# Patient Record
Sex: MALE | Race: WHITE
[De-identification: names, ages, dates, MRNs, and addresses within clinical notes are randomized per-mention and may not be internally consistent; named-entity substitution may affect disease eponyms.]

---

## 2017-08-11 ENCOUNTER — HOSPITAL ENCOUNTER (OUTPATIENT)
Dept: HOSPITAL 35 - PAIN | Age: 77
Discharge: HOME | End: 2017-08-11
Attending: ANESTHESIOLOGY
Payer: COMMERCIAL

## 2017-08-11 VITALS — BODY MASS INDEX: 26 KG/M2 | WEIGHT: 161.8 LBS | HEIGHT: 65.98 IN

## 2017-08-11 VITALS — DIASTOLIC BLOOD PRESSURE: 78 MMHG | SYSTOLIC BLOOD PRESSURE: 151 MMHG

## 2017-08-11 DIAGNOSIS — M54.16: Primary | ICD-10-CM

## 2018-03-02 ENCOUNTER — HOSPITAL ENCOUNTER (OUTPATIENT)
Dept: HOSPITAL 35 - PAIN | Age: 78
End: 2018-03-02
Attending: FAMILY MEDICINE
Payer: COMMERCIAL

## 2018-03-02 DIAGNOSIS — R05: Primary | ICD-10-CM

## 2018-05-30 ENCOUNTER — HOSPITAL ENCOUNTER (OUTPATIENT)
Dept: HOSPITAL 35 - CAT | Age: 78
End: 2018-05-30
Attending: FAMILY MEDICINE
Payer: COMMERCIAL

## 2018-05-30 DIAGNOSIS — R91.8: ICD-10-CM

## 2018-05-30 DIAGNOSIS — J92.9: Primary | ICD-10-CM

## 2018-08-08 ENCOUNTER — HOSPITAL ENCOUNTER (OUTPATIENT)
Dept: HOSPITAL 35 - RAD | Age: 78
End: 2018-08-08
Attending: FAMILY MEDICINE
Payer: COMMERCIAL

## 2018-08-08 DIAGNOSIS — K44.9: Primary | ICD-10-CM

## 2018-08-08 DIAGNOSIS — R05: ICD-10-CM

## 2020-02-21 ENCOUNTER — HOSPITAL ENCOUNTER (OUTPATIENT)
Dept: HOSPITAL 35 - SJCVCIMAG | Age: 80
End: 2020-02-21
Attending: INTERNAL MEDICINE
Payer: COMMERCIAL

## 2020-02-21 DIAGNOSIS — E78.00: ICD-10-CM

## 2020-02-21 DIAGNOSIS — Z95.1: ICD-10-CM

## 2020-02-21 DIAGNOSIS — I48.0: Primary | ICD-10-CM

## 2020-02-21 DIAGNOSIS — I25.810: ICD-10-CM

## 2020-02-21 DIAGNOSIS — I10: ICD-10-CM

## 2020-02-21 DIAGNOSIS — R94.39: ICD-10-CM

## 2020-02-21 DIAGNOSIS — Z79.899: ICD-10-CM

## 2020-03-13 ENCOUNTER — HOSPITAL ENCOUNTER (OUTPATIENT)
Dept: HOSPITAL 35 - CATH | Age: 80
Discharge: HOME | End: 2020-03-13
Attending: INTERNAL MEDICINE
Payer: COMMERCIAL

## 2020-03-13 VITALS — HEIGHT: 67 IN | BODY MASS INDEX: 24.36 KG/M2 | WEIGHT: 155.21 LBS

## 2020-03-13 VITALS — SYSTOLIC BLOOD PRESSURE: 139 MMHG | DIASTOLIC BLOOD PRESSURE: 69 MMHG

## 2020-03-13 DIAGNOSIS — Z79.82: ICD-10-CM

## 2020-03-13 DIAGNOSIS — E78.00: ICD-10-CM

## 2020-03-13 DIAGNOSIS — Z98.0: ICD-10-CM

## 2020-03-13 DIAGNOSIS — Z95.1: ICD-10-CM

## 2020-03-13 DIAGNOSIS — Z98.890: ICD-10-CM

## 2020-03-13 DIAGNOSIS — I25.810: ICD-10-CM

## 2020-03-13 DIAGNOSIS — I71.4: ICD-10-CM

## 2020-03-13 DIAGNOSIS — I50.9: ICD-10-CM

## 2020-03-13 DIAGNOSIS — I48.20: ICD-10-CM

## 2020-03-13 DIAGNOSIS — Z82.49: ICD-10-CM

## 2020-03-13 DIAGNOSIS — R94.39: Primary | ICD-10-CM

## 2020-03-13 DIAGNOSIS — Z79.01: ICD-10-CM

## 2020-03-13 DIAGNOSIS — N18.9: ICD-10-CM

## 2020-03-13 DIAGNOSIS — I13.0: ICD-10-CM

## 2020-03-13 DIAGNOSIS — I70.1: ICD-10-CM

## 2020-03-13 DIAGNOSIS — Z79.899: ICD-10-CM

## 2020-03-13 LAB
ANION GAP SERPL CALC-SCNC: 11 MMOL/L (ref 7–16)
BUN SERPL-MCNC: 29 MG/DL (ref 7–18)
CALCIUM SERPL-MCNC: 8.5 MG/DL (ref 8.5–10.1)
CHLORIDE SERPL-SCNC: 99 MMOL/L (ref 98–107)
CO2 SERPL-SCNC: 23 MMOL/L (ref 21–32)
CREAT SERPL-MCNC: 1.2 MG/DL (ref 0.7–1.3)
ERYTHROCYTE [DISTWIDTH] IN BLOOD BY AUTOMATED COUNT: 13.1 % (ref 10.5–14.5)
GLUCOSE SERPL-MCNC: 87 MG/DL (ref 74–106)
HCT VFR BLD CALC: 38.7 % (ref 42–52)
HGB BLD-MCNC: 12.9 GM/DL (ref 14–18)
MCH RBC QN AUTO: 33.1 PG (ref 26–34)
MCHC RBC AUTO-ENTMCNC: 33.3 G/DL (ref 28–37)
MCV RBC: 99.4 FL (ref 80–100)
PLATELET # BLD: 118 THOU/UL (ref 150–400)
POTASSIUM SERPL-SCNC: 3.6 MMOL/L (ref 3.5–5.1)
RBC # BLD AUTO: 3.89 MIL/UL (ref 4.5–6)
SODIUM SERPL-SCNC: 133 MMOL/L (ref 136–145)
WBC # BLD AUTO: 2.5 THOU/UL (ref 4–11)

## 2020-03-13 NOTE — EKG
[facility]
[place]   [address]                     ELECTROCARDIOGRAM REPORT      
_______________________________________________________________________________
 
Name:       VANIA PISANO       [facility] #[facility]                     [facility]      [id number]                       Account #:      [id number]  
Admission:  20    Attend Phys:    Jefferson Bass MD,
Discharge:              Date of Birth:  [birth year]  
                                                          Report #: [id number]
                                                                    [id number]
_______________________________________________________________________________
THIS REPORT FOR:  
 
cc:  Jorge L Lemus MD, Neal A. MD Lundgren,Navjot WING MD [facility]                                        ~
THIS REPORT FOR:   //name//                          
 
                          [facility]
                                       
Test Date:    2020               Test Time:    07:22:30
Pat Name:     VANIA PISANO           Department:   
Patient ID:   [id number]            Room:          
Gender:                               Technician:   MALIK CALLEJAS
:          1940               Requested By: Jefferson Bass
Order Number: [id number] MD:   Navjot Jordan
                                 Measurements
Intervals                              Axis          
Rate:         86                       P:            
IN:                                    QRS:          57
QRSD:         85                       T:            -78
QT:           380                                    
QTc:          455                                    
                           Interpretive Statements
Atrial fibrillation
Prolonged QT interval
LVH with secondary repolarization abnormality
Compared to ECG 2014 08:27:02
Left ventricular hypertrophy now present
Sinus rhythm no longer present
 
Electronically Signed On 3- 9:28:38 CDT by Navjot Jordan
[email or web address]
 
 
 
 
 
 
 
 
 
 
 
 
  <ELECTRONICALLY SIGNED>
   By: Navjto Jordan MD, [facility]   
  2028
D: 20                           _____________________________________
T: 20                           Navjot Jordan MD, [facility]     /EPI

## 2020-03-14 NOTE — CATHLAB
[facility]
[address]
[place]   [address]                   INVASIVE PROCEDURE REPORT     
_______________________________________________________________________________
 
Name:       VANIA PISANO       Room #:                     REG AISSATOU SINGH.#:      [id number]                       Account #:      [id number]  
Admission:  03/13/20    Attend Phys:    Jefferson Bass MD,
Discharge:              Date of Birth:  [birth year]  
                                                          Report #: [id number]
                                                                    [id number]
_______________________________________________________________________________
THIS REPORT FOR:  
 
cc:  Jorge L Lemus MD, Neal A. MD Mancuso, Gerald M. MD [facility]                                        
                                                                       ~
 
--------------- APPROVED REPORT --------------
 
 
Study performed:  03/13/2020 09:30:14
 
Patient Details
Patient Status: Out-Patient                  Room #: 
The patient is a 79 year-old male
 
Event Personnel
Jefferson Bass  Interventional Cardiologist, Valery Bedoya RN RN, 
Erika Longo RTR, RCIS Monitor, Harini Simon
 
Procedures Performed
Art Access - R femoral artery*  Left Heart Cath Coronaries, Bypass 
Grafts [id number] LHCCORCABG Aortogram Abdominal Peripheral Angio 723120 
Renal Bilateral Peripheral Angiography [id number] CVRENALBIL 72635 
Initial Mod Sed Same Phys/QHP Gr5y [id number] 41187 Mod Sed Same Phys/QHP 
Ea [id number] Hemostasis w/ Mynx
 
Indication
Positive stress test
 
Procedure Narrative
The Right Groin^ was infiltrated with 1% Lidocaine subcutaneous 
anesthesia. A PINNACLE 6FR Sheath #[id number] sheath was inserted into 
the RFA^. Coronary angiography was performed using coronary 
diagnostic catheters. The right coronary system was accessed and 
visualized with a JR4 catheter. The left coronary system was accessed 
and visualized with a JL4 catheter. The left ventricle was accessed 
and visualized with a pigtail catheter. Left ventricular/Aortic Valve 
gradient assessed via catheter pullback. Left ventriculogram was 
performed in 30 degree projection. An aortogram of the abdominal 
aorta was performed. Pre-demployment femoral angiogram was performed 
. Closure device was deployed with a 6 Fr MYNXGRIP 6/7F #[id number]. The 
patient tolerated the procedure well and there were no complications 
associated with the procedure. There was no hematoma.
 
Intraoperative Conscious Sedation
 
 
[facility]
[address]
[place]  [address]
Phone:  [phone]                    INVASIVE PROCEDURE REPORT     
_______________________________________________________________________________
 
Name:            VANIA PISANO       [facility] #[facility]                    [facility]           [id number]          Account #:     [id number]  
Admission:       03/13/20         Attend Phys:   Jefferson Bass,
Discharge:                  Date of Birth: [birth year]  
                         Report #:      [id number]
        [id number]
_______________________________________________________________________________
Sedation start time:  09:33           Case end Time:  
10:23    
Fentanyl  50 mcg    Versed  1 mg  
 
Fluoro Time:    8.27 minutes    
Dose:     DAP 5891.40 cGycm2  696 mGy  
Contrast Type and Amount:  Visipaque 180 ml   
 
Hemodynamics
The aortic pressure is 163/64 mmHg with a mean of 107 mmHg. The left 
ventricular pressure is 150/9 mmHg with a mean of mmHg. The left 
ventricular end diastolic pressure is 23 mmHg. 
 
Conclusion
#1.  Normal left ventricular size and subtle anterior lateral wall 
leg EF 50 to 55%
#2 abdominal aortogram reveals moderate aortic ectasia mild distal 
dilatation consistent with small aneurysm will evaluate noninvasively 
brisk flow
#3 left main mild disease mild calcification giving rise to LAD and 
circumflex there is ostial left main lesion of 30 to 40%
#4 this LAD occludes proximally and is filled via a LIMA graft some 
retrograde filling of diagonal and septal
#5 circumflex OM nondominant moderate distribution 3040% proximal and 
ostial OM lesions
#6 at LIMA to LAD is intact with mild irregularities the LAD extends 
to the apex with minimal disease
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 
competitively filling the small PDA system.  This is predominantly 
filled via vein graft
#8 and SVG to the PDA is intact with mild irregularities 
competitively filling the PDA.  Vein graft is widely patent.
#9 SVG to diagonal branch mildly ectatic mid graft lesion of 30 to 
40% filling a small diagonal with brisk filling is noted.
#10 selective renal angiography revealing an ostial right renal 
artery lesion of 30 to 40% otherwise preserved.  Left renal artery 
widely patent.
 
Recommendations and plan continue aggressive risk factor 
modification.  There is no indication for coronary intervention.  
Follow-up will be arranged.
 
 
 
  <ELECTRONICALLY SIGNED>
   By: Jefferson Bass MD, FACC   
  03/14/20 1113
D: 03/14/20 1113                           _____________________________________
T: 03/14/20 1113                           Jefferson Bass MD, FACC     /INF

## 2020-03-16 ENCOUNTER — HOSPITAL ENCOUNTER (INPATIENT)
Dept: HOSPITAL 35 - ER | Age: 80
LOS: 8 days | Discharge: HOME | DRG: 871 | End: 2020-03-24
Attending: FAMILY MEDICINE | Admitting: FAMILY MEDICINE
Payer: COMMERCIAL

## 2020-03-16 VITALS — DIASTOLIC BLOOD PRESSURE: 92 MMHG | SYSTOLIC BLOOD PRESSURE: 167 MMHG

## 2020-03-16 VITALS — HEIGHT: 67.01 IN | WEIGHT: 164.8 LBS | BODY MASS INDEX: 25.87 KG/M2

## 2020-03-16 VITALS — SYSTOLIC BLOOD PRESSURE: 175 MMHG | DIASTOLIC BLOOD PRESSURE: 90 MMHG

## 2020-03-16 VITALS — DIASTOLIC BLOOD PRESSURE: 71 MMHG | SYSTOLIC BLOOD PRESSURE: 122 MMHG

## 2020-03-16 VITALS — DIASTOLIC BLOOD PRESSURE: 59 MMHG | SYSTOLIC BLOOD PRESSURE: 137 MMHG

## 2020-03-16 VITALS — SYSTOLIC BLOOD PRESSURE: 135 MMHG | DIASTOLIC BLOOD PRESSURE: 80 MMHG

## 2020-03-16 DIAGNOSIS — M06.9: ICD-10-CM

## 2020-03-16 DIAGNOSIS — I27.20: ICD-10-CM

## 2020-03-16 DIAGNOSIS — I48.20: ICD-10-CM

## 2020-03-16 DIAGNOSIS — N17.9: ICD-10-CM

## 2020-03-16 DIAGNOSIS — N18.9: ICD-10-CM

## 2020-03-16 DIAGNOSIS — I25.10: ICD-10-CM

## 2020-03-16 DIAGNOSIS — J12.89: ICD-10-CM

## 2020-03-16 DIAGNOSIS — J96.01: ICD-10-CM

## 2020-03-16 DIAGNOSIS — Z95.1: ICD-10-CM

## 2020-03-16 DIAGNOSIS — A41.9: Primary | ICD-10-CM

## 2020-03-16 DIAGNOSIS — E78.00: ICD-10-CM

## 2020-03-16 DIAGNOSIS — Z79.899: ICD-10-CM

## 2020-03-16 DIAGNOSIS — Z79.01: ICD-10-CM

## 2020-03-16 DIAGNOSIS — I12.9: ICD-10-CM

## 2020-03-16 DIAGNOSIS — I48.0: ICD-10-CM

## 2020-03-16 DIAGNOSIS — B97.29: ICD-10-CM

## 2020-03-16 LAB
ALBUMIN SERPL-MCNC: 3.3 G/DL (ref 3.4–5)
ALT SERPL-CCNC: 60 U/L (ref 30–65)
ANION GAP SERPL CALC-SCNC: 10 MMOL/L (ref 7–16)
ANISOCYTOSIS BLD QL SMEAR: (no result)
APTT BLD: 40 SECONDS (ref 24.5–32.8)
AST SERPL-CCNC: 133 U/L (ref 15–37)
BACTERIA-REFLEX: (no result) /HPF
BASOPHILS NFR BLD AUTO: 0 % (ref 0–2)
BILIRUB SERPL-MCNC: 1.3 MG/DL
BILIRUB UR-MCNC: NEGATIVE MG/DL
BUN SERPL-MCNC: 31 MG/DL (ref 7–18)
CALCIUM SERPL-MCNC: 8.7 MG/DL (ref 8.5–10.1)
CHLORIDE SERPL-SCNC: 97 MMOL/L (ref 98–107)
CO2 SERPL-SCNC: 25 MMOL/L (ref 21–32)
COLOR UR: YELLOW
CREAT SERPL-MCNC: 1.4 MG/DL (ref 0.7–1.3)
EOSINOPHIL NFR BLD: 0 % (ref 0–3)
ERYTHROCYTE [DISTWIDTH] IN BLOOD BY AUTOMATED COUNT: 12.8 % (ref 10.5–14.5)
GLUCOSE SERPL-MCNC: 106 MG/DL (ref 74–106)
GRANULOCYTES NFR BLD MANUAL: 78 % (ref 36–66)
HCT VFR BLD CALC: 43.7 % (ref 42–52)
HGB BLD-MCNC: 14.8 GM/DL (ref 14–18)
INR PPP: 1.5
KETONES UR STRIP-MCNC: NEGATIVE MG/DL
LYMPHOCYTES NFR BLD AUTO: 8 % (ref 24–44)
MCH RBC QN AUTO: 33.4 PG (ref 26–34)
MCHC RBC AUTO-ENTMCNC: 33.8 G/DL (ref 28–37)
MCV RBC: 98.9 FL (ref 80–100)
MONOCYTES NFR BLD: 4 % (ref 1–8)
NEUTROPHILS # BLD: 4.5 THOU/UL (ref 1.4–8.2)
NEUTS BAND NFR BLD: 3 % (ref 0–8)
PLATELET # BLD: 174 THOU/UL (ref 150–400)
POTASSIUM SERPL-SCNC: 3.6 MMOL/L (ref 3.5–5.1)
PROT SERPL-MCNC: 6.7 G/DL (ref 6.4–8.2)
PROTHROMBIN TIME: 14.9 SECONDS (ref 9.3–11.4)
RBC # BLD AUTO: 4.42 MIL/UL (ref 4.5–6)
RBC # UR STRIP: (no result) /UL
RBC #/AREA URNS HPF: (no result) /HPF (ref 0–2)
SODIUM SERPL-SCNC: 132 MMOL/L (ref 136–145)
SP GR UR STRIP: >= 1.03 (ref 1–1.03)
SQUAMOUS: (no result) /LPF (ref 0–3)
TROPONIN I SERPL-MCNC: 0.14 NG/ML (ref ?–0.06)
URINE CLARITY: CLEAR
URINE GLUCOSE-RANDOM*: NEGATIVE
URINE LEUKOCYTES-REFLEX: NEGATIVE
URINE NITRITE-REFLEX: NEGATIVE
URINE PROTEIN (DIPSTICK): (no result)
UROBILINOGEN UR STRIP-ACNC: 0.2 E.U./DL (ref 0.2–1)
VARIANT LYMPHS NFR BLD MANUAL: 7 %
WBC # BLD AUTO: 5.6 THOU/UL (ref 4–11)

## 2020-03-16 PROCEDURE — 05HC33Z INSERTION OF INFUSION DEVICE INTO LEFT BASILIC VEIN, PERCUTANEOUS APPROACH: ICD-10-PCS | Performed by: FAMILY MEDICINE

## 2020-03-16 PROCEDURE — 10081 I&D PILONIDAL CYST COMP: CPT

## 2020-03-16 PROCEDURE — 10078: CPT

## 2020-03-16 PROCEDURE — 10196: CPT

## 2020-03-16 NOTE — 2DMMODE
[facility]
[address] [place]   [address]                   2 D/M-MODE ECHOCARDIOGRAM     
_______________________________________________________________________________
 
Name:       VANIA PISANO       Room #:         170-10      ADM IN  
.R.#:      [id number]                       Account #:      [id number]  
Admission:  20    Attend Phys:    Jorge L Lemus MD  
Discharge:              Date of Birth:  [birth year]  
                                                          Report #: [id number]
                                                                    [id number]
_______________________________________________________________________________
THIS REPORT FOR:  
 
cc:  Jorge L Lemus MD, Neal A. MD Lundgren,Navjot WING MD [facility]                                        
                                                                       ~
 
--------------- APPROVED REPORT --------------
 
 
Study performed:  2020 13:22:16
 
EXAM: Comprehensive 2D, Doppler, and color-flow 
Echocardiogram 
Patient Location: ER bedside   
Room #:  1     Status:  routine
 
       BSA:         1.79
HR: 71 bpm   BP:          99/63 mmHg 
Rhythm: Atrial Fibrillation    
 
Other Information 
Study Quality: Adequate
 
Indications
Hypertension/HDD
CAD s/p CABG in , HLD, cath 2020
 
2D Dimensions
RVDd:  27.26 mm  
IVSd:  13.72 (7-11mm) LVOT Diam:  21.50 (18-24mm) 
LVDd:  34.07 mm  
PWd:  14.56 (7-11mm) Ascending Ao:  38.78 (22-36mm)
LVDs:  25.30 (25-40mm) 
Aortic Root:  34.76 mm IVC:  20.00 mm
 
Volumes
Left Atrial Volume (Systole) 
Single Plane 4CH:  59.82 mL Single Plane 2CH:  87.20 mL
    LA ESV Index:  43.00 mL/m2
 
Aortic Valve
AoV Peak Adin.:  1.52 m/s 
AO Peak Gr.:  9.22 mmHg  LVOT Max P.63 mmHg
    LVOT Max V:  1.08 m/s
ERICK Vmax: 2.57 cm2  
 
 
[facility]
[address]
[place]  [address]
Phone:  [phone]                    2 D/M-MODE ECHOCARDIOGRAM     
_______________________________________________________________________________
 
Name:            VANIA PISANO       Room #:        170-10      [facility] [place]
..#:           [id number]          Account #:     [id number]  
Admission:       20         Attend Phys:   Jorge L Lemus, 
Discharge:                  Date of Birth: [birth year]  
                         Report #:      [id number]
        [id number]
_______________________________________________________________________________
AI Vmax:  3.98 m/s  
AI [place]:  2.48 m/s2  
AI PHT:  467.57 ms  
 
Mitral Valve
    MV Decel. Time:  185.25 ms
MV E Max Adin.:  1.01 m/s 
IVRT:  81.51 ms   
 
Pulmonary Valve
PV Peak Adin.:  0.73 m/s PV Peak Gr.:  2.14 mmHg
 
Tricuspid Valve
TR Peak Adin.:  3.67 m/s  RAP Estimate:  10.00 mmHg
TR Peak Gr.:  53.76 mmHg 
    PA Pressure:  64.00 mmHg
 
Left Ventricle
The left ventricle is normal size. There is normal LV segmental wall 
motion. Moderate concentric left ventricular hypertrophy. The left 
ventricular systolic function is normal. The left ventricular 
ejection fraction is within the normal range. LVEF is 60-65%. This 
study is not technically sufficient to allow evaluation of the LV 
diastolic function due to atrial fibrillation.
 
Right Ventricle
The right ventricle is normal size. The right ventricular systolic 
function is normal.
 
Atria
Left atrium is moderately dilated. The right atrium size is 
normal.
 
Aortic Valve
Aortic valve is mildly calcified. Mild to moderate aortic 
regurgitation. There is no aortic valvular stenosis.
 
Mitral Valve
The mitral valve is normal in structure. Mild mitral regurgitation. 
No evidence of mitral valve stenosis.
 
Tricuspid Valve
The tricuspid valve is normal in structure. Mild to moderate 
tricuspid regurgitation. PAP is estimated at 60 mmHg.
 
Pulmonic Valve
 
 
[facility]
[address] [place]  [address]
Phone:  [phone]                    2 D/M-MODE ECHOCARDIOGRAM     
_______________________________________________________________________________
 
Name:            VANIA PISANO       Room #:        170-10      ADM IN
M.R.#:           [id number]          Account #:     [id number]  
Admission:       20         Attend Phys:   Jorge L Lemus, 
Discharge:                  Date of Birth: [birth year]  
                         Report #:      [id number]
        [id number]
_______________________________________________________________________________
The pulmonary valve is normal in structure. Trace pulmonic 
regurgitation.
 
Great Vessels
The aortic root is normal in size. The ascending aorta is mildly 
dilated (3.9 cm). IVC is normal in size and collapses <50% with 
inspiration.
 
Pericardium
There is no pericardial effusion.
 
<Conclusion>
The left ventricular systolic function is normal.
There is normal LV segmental wall motion.
LVEF is 60-65%.
Left atrium is moderately dilated.
Aortic valve is mildly calcified. Mild to moderate aortic 
regurgitation, no stenosis
The mitral valve is normal in structure. Mild mitral 
regurgitation.
Mild to moderate tricuspid regurgitation. Pulmonary artery pressure 
estimated at 60 mmHg.
The ascending aorta is mildly dilated (3.9 cm).
There is no pericardial effusion.
 
 
 
 
 
 
 
 
 
 
 
 
 
 
 
 
 
 
 
 
  <ELECTRONICALLY SIGNED>
   By: Navjot Jordan MD, FACC   
  20     1414
D: 20 1414                           _____________________________________
T: 20 1414                           Navjot Jordan MD, FACC     /INF

## 2020-03-16 NOTE — NUR
VSS-AFEBRILE AFTER BEING MEDICATED WITH TYLENOL IN ER.  LUNGS DIMINISHED TO
AUSCULTATION IN ALL FIELDS BILATERALLY-ROOM AIR.  C/O GENERALIZED ALL OVER
BODY ACHES-PARTIAL RELIEF NOTED WITH IV PAIN MEDICATION. OOB TO USE RESTROOM
WITH STANDBY ASSIST, NO DIFFICULTY VOIDING.  FALL PRECAUTIONS IN PLACE, CALLS
APPROPRIATELY FOR ASSISTANCE.

## 2020-03-16 NOTE — EKG
[facility]
[address]
[place]   [address]                     ELECTROCARDIOGRAM REPORT      
_______________________________________________________________________________
 
Name:       VANIA PISANO       Room #:         170-10      ADM IN  
M.R.#:      [id number]                       Account #:      [id number]  
Admission:  20    Attend Phys:    Jorge L Lemus MD  
Discharge:              Date of Birth:  [birth year]  
                                                          Report #: [id number]
                                                                    [id number]
_______________________________________________________________________________
THIS REPORT FOR:  
 
cc:  Jorge L Lemus MD, Neal A. MD Lundgren,Navjot WING MD [facility]                                        ~
THIS REPORT FOR:   //name//                          
 
                         [facility] ED
                                       
Test Date:    2020               Test Time:    09:49:59
Pat Name:     VANIA PISANO           Department:   
Patient ID:   [id number]            Room:         170
Gender:       M                        Technician:   MARIAA
:          1940               Requested By: Randy Hunter
Order Number: [id number] MD:   Navjot Jordan
                                 Measurements
Intervals                              Axis          
Rate:         108                      P:            
NH:                                    QRS:          56
QRSD:         88                       T:            -79
QT:           340                                    
QTc:          456                                    
                           Interpretive Statements
Atrial fibrillation
Multiple ventricular premature complexes
Probable LVH with secondary repol abnrm
Compared to ECG 2020 07:22:30
Ventricular premature complex(es) now present
 
Electronically Signed On 3- 15:47:46 CDT by Navjot Jordan
[email or web address]
 
 
 
 
 
 
 
 
 
 
 
 
 
  <ELECTRONICALLY SIGNED>
   By: Navjot Jordan MD, FAC   
  20     1547
D: 20 0949                           _____________________________________
T: 20 0949                           Navjot Jordan MD, FACC     /EPI

## 2020-03-17 VITALS — SYSTOLIC BLOOD PRESSURE: 171 MMHG | DIASTOLIC BLOOD PRESSURE: 95 MMHG

## 2020-03-17 VITALS — SYSTOLIC BLOOD PRESSURE: 133 MMHG | DIASTOLIC BLOOD PRESSURE: 67 MMHG

## 2020-03-17 VITALS — DIASTOLIC BLOOD PRESSURE: 98 MMHG | SYSTOLIC BLOOD PRESSURE: 163 MMHG

## 2020-03-17 VITALS — SYSTOLIC BLOOD PRESSURE: 114 MMHG | DIASTOLIC BLOOD PRESSURE: 71 MMHG

## 2020-03-17 VITALS — DIASTOLIC BLOOD PRESSURE: 96 MMHG | SYSTOLIC BLOOD PRESSURE: 185 MMHG

## 2020-03-17 VITALS — SYSTOLIC BLOOD PRESSURE: 126 MMHG | DIASTOLIC BLOOD PRESSURE: 74 MMHG

## 2020-03-17 VITALS — DIASTOLIC BLOOD PRESSURE: 72 MMHG | SYSTOLIC BLOOD PRESSURE: 129 MMHG

## 2020-03-17 LAB
ANION GAP SERPL CALC-SCNC: 16 MMOL/L (ref 7–16)
BUN SERPL-MCNC: 34 MG/DL (ref 7–18)
CALCIUM SERPL-MCNC: 8.3 MG/DL (ref 8.5–10.1)
CHLORIDE SERPL-SCNC: 105 MMOL/L (ref 98–107)
CO2 SERPL-SCNC: 18 MMOL/L (ref 21–32)
CREAT SERPL-MCNC: 1.3 MG/DL (ref 0.7–1.3)
GLUCOSE SERPL-MCNC: 75 MG/DL (ref 74–106)
POTASSIUM SERPL-SCNC: 4.2 MMOL/L (ref 3.5–5.1)
SODIUM SERPL-SCNC: 139 MMOL/L (ref 136–145)

## 2020-03-17 NOTE — NUR
Assumed pt care this am, droplet isolation maintained. VS stable, pt had 2
episode of green loose stools. Pt also exhibited "the shakes" after on bm, vs
were stable, RT called since pursed lip breathing was noted. C. diff came back
negative. Still on Afib, maintained 4 lites of O2 via NC. POC followed, no
signs or verbalizations of distress noted. Awaiting results fo COVOD 19.
Stand by assists since pt is able to call out when he needs to go to the
toilet.

## 2020-03-17 NOTE — NUR
PT CONTINUES ON ISOLATION TO R/O COVID-19 VIRUS. PT STILL COUGHING CONTIUES ON
10L 02 PER NC, RT WAS NOTIFIED. DENIES SOA. TEMP 97.7. REPORTED HAVING
DIARRHEA IN THE AFTERNOON BUT NONE SINCE THEN, DENIES N/V. CONTINUING TO
MONITOR.

## 2020-03-17 NOTE — NUR
ASSUMED PT CARE AT AROUND 1900, PT IS AWAKE, ALERT AND ORIENTEDX4, VS STABLE,
AFEBRILE, O2 SAT STABLE ON ROOM AIR, COMPLAINED OF GENERALIZED PAIN RELIEVED
BY COURTNEY TOMLINSON ASSIST TO THE BATHROOM, AFIB ON THE MONITOR WITH CONTROLLED RATE,
LUNGS DIMINISHED, FLUIDS RUNNING AS ORDERED, RESTING IN BED AT THIS TIME WITH
NO DISTRESS NOTED, WILL CONTINUE TO MONITOR

## 2020-03-18 VITALS — DIASTOLIC BLOOD PRESSURE: 89 MMHG | SYSTOLIC BLOOD PRESSURE: 145 MMHG

## 2020-03-18 VITALS — SYSTOLIC BLOOD PRESSURE: 172 MMHG | DIASTOLIC BLOOD PRESSURE: 93 MMHG

## 2020-03-18 VITALS — DIASTOLIC BLOOD PRESSURE: 93 MMHG | SYSTOLIC BLOOD PRESSURE: 163 MMHG

## 2020-03-18 VITALS — SYSTOLIC BLOOD PRESSURE: 124 MMHG | DIASTOLIC BLOOD PRESSURE: 63 MMHG

## 2020-03-18 VITALS — SYSTOLIC BLOOD PRESSURE: 161 MMHG | DIASTOLIC BLOOD PRESSURE: 85 MMHG

## 2020-03-18 LAB
ANION GAP SERPL CALC-SCNC: 8 MMOL/L (ref 7–16)
BUN SERPL-MCNC: 26 MG/DL (ref 7–18)
CALCIUM SERPL-MCNC: 7.9 MG/DL (ref 8.5–10.1)
CHLORIDE SERPL-SCNC: 102 MMOL/L (ref 98–107)
CO2 SERPL-SCNC: 24 MMOL/L (ref 21–32)
CREAT SERPL-MCNC: 1.1 MG/DL (ref 0.7–1.3)
ERYTHROCYTE [DISTWIDTH] IN BLOOD BY AUTOMATED COUNT: 13.2 % (ref 10.5–14.5)
GLUCOSE SERPL-MCNC: 99 MG/DL (ref 74–106)
HCT VFR BLD CALC: 37.2 % (ref 42–52)
HGB BLD-MCNC: 12.6 GM/DL (ref 14–18)
MCH RBC QN AUTO: 33.7 PG (ref 26–34)
MCHC RBC AUTO-ENTMCNC: 33.7 G/DL (ref 28–37)
MCV RBC: 99.8 FL (ref 80–100)
PLATELET # BLD: 176 THOU/UL (ref 150–400)
POTASSIUM SERPL-SCNC: 3.3 MMOL/L (ref 3.5–5.1)
RBC # BLD AUTO: 3.73 MIL/UL (ref 4.5–6)
SODIUM SERPL-SCNC: 134 MMOL/L (ref 136–145)
WBC # BLD AUTO: 5.6 THOU/UL (ref 4–11)

## 2020-03-18 NOTE — NUR
RECEIVED PT'S AROUND 0730; PT. ON BED; AOX4; DURING AM ASSESSMENT NO C/O PAIN;
ELEVATED RR; PHYSICIAN AWARED; SBP ON THE 170s; DR. DUPREE AT THE BED SIDE;
NOTIFIED; REQUESTED RE-ASSESSMENT BEFORE LEAVING; BP RE-ASSESSMENT; CHECK
CHARTING; SBP ELEVATED; GORGE NP NOTIFIED; ORDERS ON PLACED; MEDICATION
GIVEN; DURING THE AFTERNOON ELEVATED RR; DR. BOWDEN NOTIFIED; NO NEW
ORDERS; PRN BREATHING TREATMENT REQUESTED; RE-ASSESSMENT RESPIRATIONS TO BASE
LINE ON THE AM; PULMONOLOGIST NOTIFIED; ORDERS RECEIVED; SBP ON THE 160s; DR. DUPREE NOTIFIED DURING CALL FOR PT'S STATUS UPDATE; NO NEW ORDERS; PER DR. BOWDEN & AMBER IF PT'S RESPIRATIONS INCREASE OR PT. C/O SOB; MIGHT BE TRANSFER
TO ICU; NOTIFIED PHYSICIANS; ASSESSMENT AS CHARGED; FOLLOWING POC; WILL PASS
ON REPORT;

## 2020-03-18 NOTE — NUR
PT A&O X4 ABLE TO MAKE NEEDS KNOWN. CONTINUES ON ISOLATION TO R/O COVID-19.
DENIES PAIN. O2 8L PER NC. UP AD ISIDRO. NO NEW ACUTE FINDINGS OVER NIGHT.
INDEPENDENT. PT CALLS APPROPRIATELY.

## 2020-03-19 VITALS — DIASTOLIC BLOOD PRESSURE: 84 MMHG | SYSTOLIC BLOOD PRESSURE: 136 MMHG

## 2020-03-19 VITALS — SYSTOLIC BLOOD PRESSURE: 127 MMHG | DIASTOLIC BLOOD PRESSURE: 76 MMHG

## 2020-03-19 VITALS — SYSTOLIC BLOOD PRESSURE: 121 MMHG | DIASTOLIC BLOOD PRESSURE: 68 MMHG

## 2020-03-19 VITALS — SYSTOLIC BLOOD PRESSURE: 122 MMHG | DIASTOLIC BLOOD PRESSURE: 66 MMHG

## 2020-03-19 VITALS — DIASTOLIC BLOOD PRESSURE: 70 MMHG | SYSTOLIC BLOOD PRESSURE: 120 MMHG

## 2020-03-19 VITALS — DIASTOLIC BLOOD PRESSURE: 74 MMHG | SYSTOLIC BLOOD PRESSURE: 148 MMHG

## 2020-03-19 LAB
ANION GAP SERPL CALC-SCNC: 8 MMOL/L (ref 7–16)
BUN SERPL-MCNC: 24 MG/DL (ref 7–18)
CALCIUM SERPL-MCNC: 8.3 MG/DL (ref 8.5–10.1)
CHLORIDE SERPL-SCNC: 103 MMOL/L (ref 98–107)
CO2 SERPL-SCNC: 24 MMOL/L (ref 21–32)
CREAT SERPL-MCNC: 1 MG/DL (ref 0.7–1.3)
ERYTHROCYTE [DISTWIDTH] IN BLOOD BY AUTOMATED COUNT: 13.2 % (ref 10.5–14.5)
GLUCOSE SERPL-MCNC: 102 MG/DL (ref 74–106)
HCT VFR BLD CALC: 34.7 % (ref 42–52)
HGB BLD-MCNC: 11.6 GM/DL (ref 14–18)
MCH RBC QN AUTO: 33.2 PG (ref 26–34)
MCHC RBC AUTO-ENTMCNC: 33.4 G/DL (ref 28–37)
MCV RBC: 99.5 FL (ref 80–100)
PLATELET # BLD: 163 THOU/UL (ref 150–400)
POTASSIUM SERPL-SCNC: 3.5 MMOL/L (ref 3.5–5.1)
RBC # BLD AUTO: 3.49 MIL/UL (ref 4.5–6)
SODIUM SERPL-SCNC: 135 MMOL/L (ref 136–145)
WBC # BLD AUTO: 5.2 THOU/UL (ref 4–11)

## 2020-03-19 NOTE — NUR
ASSUMED CARE OF PT FROM DAY SHIFT PT RESTING IN BED , SOA , RESP RATE 24 LUNG
SOUNDS DIMINSHED THROUGHOUT 02 AT 8L NC SAT 96 % , 02 DECREASED TO 6L NC SAT
REMAINED 96 %. DENIES PAIN . AFIB ON CARDIAC MONITOR VSS STABLE TEMP 98.5 AX
AT 0330 WHILE PT ON CPAP, SAT 98 % , WILL CONITUNE WITH CURRENT PLAN OF CARE
AN DWILL REPORT CHANGES OR ABNORMAL FINDINGS.PT RESTING WELL THROUGHOUT HOURLY
ROUNDS.

## 2020-03-19 NOTE — NUR
VASCULAR ACCESS CONSULTED FOR PICC LINE BUT PT HAS 2 DAILY ANTIBIOTICS,
DISCUSSED INSERTION OF MIDLINE, BENEFITS AND RISK WITH PT. PT VERBALIZED
CONSENT FOR MIDLINE. PT'S MEDS,LABS,HISTORY REVIEWED.
KHOA BASILIC WAS WIDELY PATENT WITH USG. 4FR POWER MIDLINE TRIMMED TO 9CM
INSERTED TO 0CM EXTERNAL WITH BRISK BR. PT TOLERATED WELL. ML RELEASED FOR
IMMEDIATE USE TO TALHA SHAW PER PROTOCOL.

## 2020-03-20 VITALS — DIASTOLIC BLOOD PRESSURE: 92 MMHG | SYSTOLIC BLOOD PRESSURE: 179 MMHG

## 2020-03-20 VITALS — DIASTOLIC BLOOD PRESSURE: 91 MMHG | SYSTOLIC BLOOD PRESSURE: 162 MMHG

## 2020-03-20 VITALS — SYSTOLIC BLOOD PRESSURE: 137 MMHG | DIASTOLIC BLOOD PRESSURE: 78 MMHG

## 2020-03-20 VITALS — SYSTOLIC BLOOD PRESSURE: 161 MMHG | DIASTOLIC BLOOD PRESSURE: 79 MMHG

## 2020-03-20 VITALS — DIASTOLIC BLOOD PRESSURE: 93 MMHG | SYSTOLIC BLOOD PRESSURE: 162 MMHG

## 2020-03-20 VITALS — SYSTOLIC BLOOD PRESSURE: 144 MMHG | DIASTOLIC BLOOD PRESSURE: 81 MMHG

## 2020-03-20 VITALS — SYSTOLIC BLOOD PRESSURE: 170 MMHG | DIASTOLIC BLOOD PRESSURE: 79 MMHG

## 2020-03-20 VITALS — DIASTOLIC BLOOD PRESSURE: 73 MMHG | SYSTOLIC BLOOD PRESSURE: 166 MMHG

## 2020-03-20 VITALS — SYSTOLIC BLOOD PRESSURE: 157 MMHG | DIASTOLIC BLOOD PRESSURE: 84 MMHG

## 2020-03-20 VITALS — DIASTOLIC BLOOD PRESSURE: 85 MMHG | SYSTOLIC BLOOD PRESSURE: 164 MMHG

## 2020-03-20 LAB
ANION GAP SERPL CALC-SCNC: 10 MMOL/L (ref 7–16)
BUN SERPL-MCNC: 22 MG/DL (ref 7–18)
CALCIUM SERPL-MCNC: 7.6 MG/DL (ref 8.5–10.1)
CHLORIDE SERPL-SCNC: 105 MMOL/L (ref 98–107)
CO2 SERPL-SCNC: 22 MMOL/L (ref 21–32)
CREAT SERPL-MCNC: 1 MG/DL (ref 0.7–1.3)
GLUCOSE SERPL-MCNC: 90 MG/DL (ref 74–106)
POTASSIUM SERPL-SCNC: 3.4 MMOL/L (ref 3.5–5.1)
SODIUM SERPL-SCNC: 137 MMOL/L (ref 136–145)

## 2020-03-20 NOTE — NUR
ASSESSMENT: CM REVIEWED CHART AND SPOKE WITH PATIENTS WIFE STEFFI. PT LIVES IN
A HOUSE WITH HIS WIFE. SHE REPORTS THEY HAVE AN ELEVATOR AT THEIR HOME SO HE
WOULD NOT HAVE TO USE ANY STEPS IF THAT WERE NEEDED. PT NORMALLY AMBULATES
INDEPENDENTLY AND IS INDEPENDENT WITH ADLS. SHE DOES REPORT THEY HAVE A CANE
AND WALKER AT HOME IF NEEDED ALSO. WIFE REPORTS THEY HAVE A SHOWER CHAIR BUT
DO NOT USE IT. PT DOES NOT HAVE OXYGEN ARRANGED AT HOME AND IS CURRENTLY ON 6L
SO CM WILL CONTINUE TO FOLLOW. PT IS IN ISOLATION/DROPLET AND CONTACT
PRECAUTIONS DUE TO COVID19 TEST PENDING RESULTS. PT HAS NOT HAD HH IN THE PAST
NOR BEEN TO A SNF. CM WILL CONTINUE TO FOLLOW TO ASSIST AS NEEDED.

## 2020-03-20 NOTE — NUR
ASSUMED CARE AT 1630 FROM [facility] ON 2N. PT ASSESSMENTS AS CHARTED. NO ACUTE
S/S OF RESPIRATORY DISTRESS, WILL DE-SAT WITH EXHERTION. PT NOT PROGRESSING
TOWARDS PLAN OF CARE AS EVIDANCE BY TRANSFER TO ICU. WILL CONTINUE TO MONITOR.

## 2020-03-20 NOTE — NUR
ASSUMED PT CARE AROUND 1900. PT RESTING IN BED WATCHING TELEVISION. PT
ASSESSMENT AS CHARTED. HR MAINTAINED AND VSS. PT REMAINED AFEBRILE THRU NIGHT.
PT OXYGEN SATURATION MAINTAINED BETWEEN 95-97%. CARES ARE BUNDLED TO PREVENT
SOA WITH EXERTION. STEROID HELD PER PHYSICIAN UNTIL TEST RESULTS ARE BACK AND
NOTED ON MED REC. PT SLEPT THRU NIGHT WITH MINIMAL INTERRUPTIONS. WILL
CONTINUE TO KEEP CLOSE WATCH ON PATIENT ACCORDING TO PLAN OF CARES.

## 2020-03-20 NOTE — NUR
ASSUMED CARE 0700. ALERT X4, DENIES PAIN, SOB WITH ADL'S. 6L HIGH FLOW NASAL
CANNULA, BM TODAY. ONE TIME REQUEST FOR BREATHING TREATMENT THIS MORNING.
POSITIVE FOR COVID-19 NOTIFIED DR BOWDEN AND DR CLARK. PT MOVING TO ICU ROOM
237. REPORTED OFF TO COLIN BRAVO.

## 2020-03-21 VITALS — SYSTOLIC BLOOD PRESSURE: 157 MMHG | DIASTOLIC BLOOD PRESSURE: 90 MMHG

## 2020-03-21 VITALS — SYSTOLIC BLOOD PRESSURE: 151 MMHG | DIASTOLIC BLOOD PRESSURE: 78 MMHG

## 2020-03-21 VITALS — SYSTOLIC BLOOD PRESSURE: 147 MMHG | DIASTOLIC BLOOD PRESSURE: 84 MMHG

## 2020-03-21 VITALS — DIASTOLIC BLOOD PRESSURE: 75 MMHG | SYSTOLIC BLOOD PRESSURE: 135 MMHG

## 2020-03-21 VITALS — DIASTOLIC BLOOD PRESSURE: 88 MMHG | SYSTOLIC BLOOD PRESSURE: 158 MMHG

## 2020-03-21 VITALS — DIASTOLIC BLOOD PRESSURE: 85 MMHG | SYSTOLIC BLOOD PRESSURE: 174 MMHG

## 2020-03-21 VITALS — SYSTOLIC BLOOD PRESSURE: 154 MMHG | DIASTOLIC BLOOD PRESSURE: 80 MMHG

## 2020-03-21 NOTE — NUR
PT IS ALERT AND ORINETED X4. LUNGS ARE CLEAR. COUGHS NONPRODUCTIVE INSTRUCTED
PT TO COVER HIS MOUTH WITH COUGHING. ASSISTED PT TO THE BATHROOM WITH NURSING
ASSISTANCE. EATING A HEART HEALTHY DIET. CELL PHONE AT BEDSIDE TO UPDATE
FAMILY ON PROGRESS. VS STABLE. EDUCATION GIVEN ON NECESSARY NURSING CARE TO
PROTECT MYSELF AND HIM WITH CARING FOR PER NURSING AT THIS TIME. WILL CONTINUE
TO MONITOR AND ASSESS PER NURSING

## 2020-03-21 NOTE — NUR
ASSUMED CARE OF PATIENT AT 1900. PATIENT ALERT AND ORIENTED X4, NO COMPLAINTS
OF PAIN. ON 6L HIGHFLOW NASAL CANNULA, O2 SAT REMAINED ABOVE 92. PATIENT
TRANSFERS FROM BED TO COMMODE WITH STANDBY ASSISTANCE. PATIENT STEADY ON FEET,
NO SHORTESS OF BREATH NOTED WITH INCREASED ACTIVITY. A-FLUTTER ON
TELEMETRY MONITOR. PLAN OF CARE DISCUSSED WITH PATIENT, PATIENT VERBALIZED
UNDERSTANDING. NO SIGNIFICANT EVENTS THROUGHT THE NIGHT, PATIENT RESTED
COMFORTABLY. NO SIGN OF ACUTE DISTRESS NOTED. WILL CONTINUE TO MONITOR.

## 2020-03-22 VITALS — DIASTOLIC BLOOD PRESSURE: 94 MMHG | SYSTOLIC BLOOD PRESSURE: 180 MMHG

## 2020-03-22 VITALS — SYSTOLIC BLOOD PRESSURE: 189 MMHG | DIASTOLIC BLOOD PRESSURE: 104 MMHG

## 2020-03-22 VITALS — SYSTOLIC BLOOD PRESSURE: 155 MMHG | DIASTOLIC BLOOD PRESSURE: 71 MMHG

## 2020-03-22 VITALS — SYSTOLIC BLOOD PRESSURE: 190 MMHG | DIASTOLIC BLOOD PRESSURE: 98 MMHG

## 2020-03-22 VITALS — DIASTOLIC BLOOD PRESSURE: 96 MMHG | SYSTOLIC BLOOD PRESSURE: 203 MMHG

## 2020-03-22 NOTE — NUR
ASSUMED CARE OF PT AT MN. NO CHANGES OVERNIGHT. PT ON 5L O2 NC. DENIES PAIN
AND VOICES NO COMPLAINTS. PT IS PROGRESSING TOWARDS GOALS. WILL CONTINUE TO
MONITOR.

## 2020-03-22 NOTE — NUR
ASSESSMENTS AND INTERVENTIONS AS DOCCUMENTE. NO MAJOR CHANGES. PATIENT HAVING
PRODUCTIVE COUGH. DR. AMBER VALENTINE. RN TITRATING DOWN ON OXYEN. PATIENT NOW ON 4L.
PATIENT PROGRESSING TOWARDS GOALS AS EVIENCE BY BEING AFEBRILE AND REQUIRING
LESS OXYGEN.

## 2020-03-23 VITALS — DIASTOLIC BLOOD PRESSURE: 83 MMHG | SYSTOLIC BLOOD PRESSURE: 152 MMHG

## 2020-03-23 VITALS — DIASTOLIC BLOOD PRESSURE: 77 MMHG | SYSTOLIC BLOOD PRESSURE: 177 MMHG

## 2020-03-23 VITALS — DIASTOLIC BLOOD PRESSURE: 77 MMHG | SYSTOLIC BLOOD PRESSURE: 172 MMHG

## 2020-03-23 VITALS — DIASTOLIC BLOOD PRESSURE: 78 MMHG | SYSTOLIC BLOOD PRESSURE: 145 MMHG

## 2020-03-23 NOTE — NUR
PT's respiratory status appears to be improving towards care plan goals. His
oxygen saturation is 100% on 6L nasal cannula. Nurse titrated O2 down to 3L.
PT is currently at 94% oxygen saturation. Lung sounds are clear. A cough is
noted but no sputum has been observed by nursing staff. Call light within
reach. Nurse will continue to monitor.

## 2020-03-23 NOTE — NUR
PT expressed concern about not having an echocardiogram done during this
admission despite the providers talking about doing so. Nurse told PT that
they performed an echocardiogram on March 16th at 1227 in the emergency
department. PT stated he did not remember ever having this done but insisted
that they may need to do another one as his condition has changed. PT denied
any chest pain or discomfort. VSS. Nurse spoke with Dr. Bass who was on
unit and informed him of the PT's concerns. Dr. Bass spoke with the PT at
length stating that an echocardiogram is not indicated at this time. Nurse
also updated provider that the PT has been progressing towards his oxygenation
goals as his oxygen has now been titrated down to 2L nasal cannula. Provider
verbalized understanding and told the PT that he may in fact be able to go
home tomorrow under self quarantine. Nurse will continue to monitor.

## 2020-03-23 NOTE — NUR
ON-GOING ASSESSMENT: CM REVIEWED CHART AND SPOKE WITH PATIENTS WIFE STEFFI. PT
IS SLOWLY PROGRESSING TOWARDS DISCHARGE GOALS. PT IS CURRENTLY ON 5-6 L OXYGEN
AND NEEDS TO BE ON 4 OR LESS TO RETURN HOME TO ISOLATION DUE TO TESTING
POSITIVE FOR COVID 19. PT IS ALSO BEING RETESTED FOR COVID. CM SPOKE WITH
PATIENTS WIFE ABOUT POSSIBLE NEED FOR HOME OXYGEN ONCE HE IS DISCHARGED. SHE
STATES THEY DO NOT HAVE A PREFERENCE OF PROVIDER. CM REACHED OUT TO PROVIDER
PLUS LIASON TO NOTIFY HER OF POSSIBLE NEED FOR OXYGEN. SHE STATES THEY ACCEPT
HIS INSURANCE AND WILL JUST NEED A SAT EXERCISE TEST ODERED WITHIN 48 HOURS OF
HIS DISCHARGE. CM WILL CONTINUE TO FOLLOW TO ASSIST AS NEEDED.

## 2020-03-23 NOTE — NUR
Nurse spoke with [facility] lab in regards to a covid-19 swab. According to the
medical chart it states the test has not been received although nurse walked
the test to lab at approximately 1300 and handed them to a lab clinician.
Tenisha in lab she stated that they did have the test. Nurse verbalized
understanding.

## 2020-03-23 NOTE — NUR
Assess for length of stay.  Admit with pneumonia, +COVID-19.  Wt usually in
150s, today it is 164 lb.  Intake is variable, 25-90% meals.  Electrolytes
being replaced.  Will add Ensure Enlive bid until intake more consistent.
Otherwise low nutrition risk

## 2020-03-24 VITALS — SYSTOLIC BLOOD PRESSURE: 150 MMHG | DIASTOLIC BLOOD PRESSURE: 70 MMHG

## 2020-03-24 VITALS — DIASTOLIC BLOOD PRESSURE: 70 MMHG | SYSTOLIC BLOOD PRESSURE: 150 MMHG

## 2020-03-24 VITALS — SYSTOLIC BLOOD PRESSURE: 166 MMHG | DIASTOLIC BLOOD PRESSURE: 82 MMHG

## 2020-03-24 NOTE — NUR
on-going assessment: danial reviewed chart and received a call from shalonda at
[facility] who states they cannot supply oxygen to pt due to posivite covid
19 and not having staff available to assist with delivery. danial reached out to
[facility] who states they can supply the oxygen. SHALONDA DELIVERED PORTABLE TANK
TO THE HOSPITAL AND SOUMAY ALSO DELIVERED OXYGEN TO PATIENTS HOME PRIOR TO
HIS DISCHARGE. DANIAL SPOKE WITH PATIENTS WIFE WHO STATES THEY HAVE NO FURTHER
QUESTIONS.

## 2020-03-24 NOTE — NUR
Assumed patient care at 0700. a/o x4. titrated 02 to 2l/nc. desat with
exertion. 87% on ra when cough and eatting. dc to homr with 02. covid 19
education given.

## 2020-03-24 NOTE — NUR
PT RAMAINS AFEBRILE AND 2-3L O2 PER NC SATS 91-95% MOST OF SHIFT. POSITIVE FOR
COUGH BUT NO C/O SOB. PT WILL DESAT UPPER 80S BUT BOUNCE RIGHT BACK.

## 2020-03-24 NOTE — NUR
UP TO TOILET THEN BACK TO BED.PT ABARCA,SOB AT REST FOR FIRST 5 MIN'S OR SO. PT W
BARKY COUGH. O2 DOWN TO 2L/M NC. STATES HE IS BEING DISCHARGED HOME, WILL HAVE
TO HAVE O2 TANK & EQUIPMENT SENT HOME PRIOR TO DISCHARGE.PT WEARING MASK WHEN
RN IN ROOM.--VW

## 2020-04-16 ENCOUNTER — HOSPITAL ENCOUNTER (EMERGENCY)
Dept: HOSPITAL 35 - ER | Age: 80
Discharge: HOME | End: 2020-04-16
Payer: COMMERCIAL

## 2020-04-16 VITALS — DIASTOLIC BLOOD PRESSURE: 70 MMHG | SYSTOLIC BLOOD PRESSURE: 160 MMHG

## 2020-04-16 VITALS — BODY MASS INDEX: 23.54 KG/M2 | WEIGHT: 150 LBS | HEIGHT: 67 IN

## 2020-04-16 DIAGNOSIS — U07.1: Primary | ICD-10-CM

## 2020-04-16 DIAGNOSIS — R05: ICD-10-CM

## 2020-05-19 ENCOUNTER — HOSPITAL ENCOUNTER (OUTPATIENT)
Dept: HOSPITAL 35 - SJCVCIMAG | Age: 80
End: 2020-05-19
Attending: INTERNAL MEDICINE
Payer: COMMERCIAL

## 2020-05-19 DIAGNOSIS — M06.9: ICD-10-CM

## 2020-05-19 DIAGNOSIS — Z79.899: ICD-10-CM

## 2020-05-19 DIAGNOSIS — I08.3: ICD-10-CM

## 2020-05-19 DIAGNOSIS — I25.10: ICD-10-CM

## 2020-05-19 DIAGNOSIS — I11.0: ICD-10-CM

## 2020-05-19 DIAGNOSIS — I50.33: ICD-10-CM

## 2020-05-19 DIAGNOSIS — Z86.19: ICD-10-CM

## 2020-05-19 DIAGNOSIS — D68.59: ICD-10-CM

## 2020-05-19 DIAGNOSIS — E78.00: ICD-10-CM

## 2020-05-19 DIAGNOSIS — R94.31: Primary | ICD-10-CM

## 2020-05-19 DIAGNOSIS — Z95.1: ICD-10-CM

## 2020-05-19 DIAGNOSIS — I71.4: ICD-10-CM

## 2020-05-19 DIAGNOSIS — I48.91: ICD-10-CM

## 2020-07-08 ENCOUNTER — HOSPITAL ENCOUNTER (OUTPATIENT)
Dept: HOSPITAL 35 - SJCVC | Age: 80
End: 2020-07-08
Attending: INTERNAL MEDICINE
Payer: COMMERCIAL

## 2020-07-08 DIAGNOSIS — I25.10: Primary | ICD-10-CM

## 2020-07-08 DIAGNOSIS — Z95.1: ICD-10-CM

## 2020-07-08 DIAGNOSIS — D68.59: ICD-10-CM

## 2020-07-08 DIAGNOSIS — I10: ICD-10-CM

## 2020-07-08 DIAGNOSIS — E78.00: ICD-10-CM

## 2020-07-08 DIAGNOSIS — I48.20: ICD-10-CM

## 2020-07-08 DIAGNOSIS — Z86.19: ICD-10-CM

## 2020-07-08 DIAGNOSIS — I71.4: ICD-10-CM

## 2020-09-22 ENCOUNTER — HOSPITAL ENCOUNTER (OUTPATIENT)
Dept: HOSPITAL 35 - BC | Age: 80
End: 2020-09-22
Attending: FAMILY MEDICINE
Payer: COMMERCIAL

## 2020-09-22 DIAGNOSIS — N62: Primary | ICD-10-CM

## 2021-01-19 ENCOUNTER — HOSPITAL ENCOUNTER (OUTPATIENT)
Dept: HOSPITAL 35 - SJCVC | Age: 81
End: 2021-01-19
Attending: INTERNAL MEDICINE
Payer: COMMERCIAL

## 2021-01-19 DIAGNOSIS — I48.20: ICD-10-CM

## 2021-01-19 DIAGNOSIS — E78.00: ICD-10-CM

## 2021-01-19 DIAGNOSIS — I12.9: ICD-10-CM

## 2021-01-19 DIAGNOSIS — Z79.899: ICD-10-CM

## 2021-01-19 DIAGNOSIS — N18.30: ICD-10-CM

## 2021-01-19 DIAGNOSIS — I71.4: ICD-10-CM

## 2021-01-19 DIAGNOSIS — Z95.1: ICD-10-CM

## 2021-01-19 DIAGNOSIS — D68.59: ICD-10-CM

## 2021-01-19 DIAGNOSIS — R94.31: Primary | ICD-10-CM

## 2021-01-19 DIAGNOSIS — I25.810: ICD-10-CM

## 2021-06-25 ENCOUNTER — HOSPITAL ENCOUNTER (INPATIENT)
Dept: HOSPITAL 35 - ER | Age: 81
LOS: 2 days | Discharge: HOME | DRG: 149 | End: 2021-06-27
Attending: FAMILY MEDICINE | Admitting: FAMILY MEDICINE
Payer: COMMERCIAL

## 2021-06-25 VITALS — SYSTOLIC BLOOD PRESSURE: 160 MMHG | DIASTOLIC BLOOD PRESSURE: 80 MMHG

## 2021-06-25 VITALS — SYSTOLIC BLOOD PRESSURE: 159 MMHG | DIASTOLIC BLOOD PRESSURE: 58 MMHG

## 2021-06-25 VITALS — HEIGHT: 67.01 IN | WEIGHT: 150 LBS | BODY MASS INDEX: 23.54 KG/M2

## 2021-06-25 VITALS — DIASTOLIC BLOOD PRESSURE: 77 MMHG | SYSTOLIC BLOOD PRESSURE: 155 MMHG

## 2021-06-25 DIAGNOSIS — Z79.01: ICD-10-CM

## 2021-06-25 DIAGNOSIS — H81.10: Primary | ICD-10-CM

## 2021-06-25 DIAGNOSIS — E78.5: ICD-10-CM

## 2021-06-25 DIAGNOSIS — Z90.49: ICD-10-CM

## 2021-06-25 DIAGNOSIS — Z79.899: ICD-10-CM

## 2021-06-25 DIAGNOSIS — I10: ICD-10-CM

## 2021-06-25 DIAGNOSIS — I25.10: ICD-10-CM

## 2021-06-25 DIAGNOSIS — E78.00: ICD-10-CM

## 2021-06-25 LAB
ANION GAP SERPL CALC-SCNC: 12 MMOL/L (ref 7–16)
BILIRUB UR-MCNC: NEGATIVE MG/DL
BUN SERPL-MCNC: 26 MG/DL (ref 7–18)
CALCIUM SERPL-MCNC: 9.4 MG/DL (ref 8.5–10.1)
CHLORIDE SERPL-SCNC: 107 MMOL/L (ref 98–107)
CO2 SERPL-SCNC: 22 MMOL/L (ref 21–32)
COLOR UR: YELLOW
CREAT SERPL-MCNC: 1.3 MG/DL (ref 0.7–1.3)
ERYTHROCYTE [DISTWIDTH] IN BLOOD BY AUTOMATED COUNT: 13.2 % (ref 10.5–14.5)
GLUCOSE SERPL-MCNC: 142 MG/DL (ref 74–106)
HCT VFR BLD CALC: 38.7 % (ref 42–52)
HGB BLD-MCNC: 13.3 GM/DL (ref 14–18)
KETONES UR STRIP-MCNC: NEGATIVE MG/DL
MCH RBC QN AUTO: 34.2 PG (ref 26–34)
MCHC RBC AUTO-ENTMCNC: 34.4 G/DL (ref 28–37)
MCV RBC: 99.6 FL (ref 80–100)
PLATELET # BLD: 210 THOU/UL (ref 150–400)
POTASSIUM SERPL-SCNC: 3.7 MMOL/L (ref 3.5–5.1)
RBC # BLD AUTO: 3.89 MIL/UL (ref 4.5–6)
RBC # UR STRIP: NEGATIVE /UL
SODIUM SERPL-SCNC: 141 MMOL/L (ref 136–145)
SP GR UR STRIP: >= 1.03 (ref 1–1.03)
URINE CLARITY: CLEAR
URINE GLUCOSE-RANDOM*: NEGATIVE
URINE LEUKOCYTES-REFLEX: NEGATIVE
URINE NITRITE-REFLEX: NEGATIVE
URINE PROTEIN (DIPSTICK): (no result)
UROBILINOGEN UR STRIP-ACNC: 0.2 E.U./DL (ref 0.2–1)
WBC # BLD AUTO: 5.3 THOU/UL (ref 4–11)

## 2021-06-25 PROCEDURE — 10040 EXTRACTION: CPT

## 2021-06-26 VITALS — SYSTOLIC BLOOD PRESSURE: 148 MMHG | DIASTOLIC BLOOD PRESSURE: 71 MMHG

## 2021-06-26 VITALS — SYSTOLIC BLOOD PRESSURE: 147 MMHG | DIASTOLIC BLOOD PRESSURE: 69 MMHG

## 2021-06-26 VITALS — DIASTOLIC BLOOD PRESSURE: 74 MMHG | SYSTOLIC BLOOD PRESSURE: 145 MMHG

## 2021-06-26 NOTE — NUR
ASSUMED PT CARE THIS AM. PT A&OX4, ABLE TO MAKE NEEDS KNOWN. PATIENT HAS NO
COMPLAINTS OF PAIN, BUT COMPLAINS OF DIZZINESS. PATIENT REMAINS CONTINENT,
USING A URINAL WHEN NEEDED. PATIENT HARD OF HEARING. PATIENT TAKING
MEDICATIONS WELL WITHOUT ISSUE. FALL PRECAUTIONS ARE IN PLACE, CALL LIGHT
WITHIN REACH. DR. BOWDEN UPDATED ON HOW PATIENT'S DIZZINESS IS THROUGHOUT
SHIFT.

## 2021-06-26 NOTE — NUR
New admit for vertigo. dr. prather notified by er staff,that patient is
admitted.  patient aox4 makes needs known. patient incontient this
shift,pericare and barrier cream applied as needed. patient skin is intact
and dry. wife called and notified that patient was admitted.  patient denied
pain or discomfort. patient felt less dizzy this shift. patient need
maximum assistance with adl, bed mobility, transfer and toileting.fall
precaution in place.patient in bed asleep at this time breathing regular and
unlaboured.

## 2021-06-27 VITALS — SYSTOLIC BLOOD PRESSURE: 153 MMHG | DIASTOLIC BLOOD PRESSURE: 69 MMHG

## 2021-06-27 VITALS — DIASTOLIC BLOOD PRESSURE: 69 MMHG | SYSTOLIC BLOOD PRESSURE: 153 MMHG

## 2021-06-27 NOTE — NUR
ASSUMED PT CARE THIS AM. PT A&OX4, ABLE TO MAKE NEEDS KNOWN. PATIENT REMAINS
CONTINENT. PATIENT REMAINS CONTIENT, AMBULATORY TO THE BATHROOM WITH ASSIST
AND A WALKER. IV PATENT, PATIENT TOOK ALL MORNING MEDS WITHOUT ISSUE.
 
PATIENT SIGNED DISCHARGE PAPERS AND REPORTS UNDERSTANDING OF DISCHARGE
TEACHING. IV REMOVED. AWAITING PATIENT RIDE AT THIS TIME.

## 2021-06-27 NOTE — NUR
PT LYING IN BED.  VOIDING PER URINAL.  DENIES PAIN.  RESTING COMFORTABLY.  NO
NEEDS VOICED.  CALL LIGHT WITHIN REACH.  FREQUENT OBSERVATION.

## 2021-06-28 NOTE — EKG
[address] [employer]
  [address]
Phone:  [phone]                    ELECTROCARDIOGRAM REPORT      
_______________________________________________________________________________
 
Name:       VANIA PISANO       Room #:         454-P       [facility] [place]  
M.R.#:      [id number]     Account #:      [id number]  
Admission:  21    Attend Phys:    Jorge L Lemus MD  
Discharge:  21    Date of Birth:  [birth year]  
                                                          Report #: [id number]
   [id number]
_______________________________________________________________________________
                         [facility] ED
                                       
Test Date:    2021               Test Time:    15:08:35
Pat Name:     VANIA PISANO           Department:   
Patient ID:   [id number]            Room:         [facility]
Gender:       M                        Technician:   MANN
:          1940               Requested By: Pippa Leonard
Order Number: [id number] MD:   Jourdan Garcia
                                 Measurements
Intervals                              Axis          
Rate:         65                       P:            
ME:                                    QRS:          54
QRSD:         100                      T:            48
QT:           475                                    
QTc:          494                                    
                           Interpretive Statements
Atrial fibrillation
Borderline T abnormalities, anterior leads
Borderline prolonged QT interval
Compared to ECG 2020 09:49:59
T-wave abnormality now present
Ventricular premature complex(es) no longer present
Electronically Signed On 2021 7:10:21 CDT by Jourdan Garcia
[email or web address]
 
 
 
 
 
 
 
 
 
 
 
 
 
 
 
 
 
 
 
  <ELECTRONICALLY SIGNED>
   By: Jourdan Garcia MD, [facility]    
  21     0710
D: 21 1508                           _____________________________________
T: 21 1508                           Jourdan Garcia MD, [facility]      /EPI

## 2021-07-19 ENCOUNTER — HOSPITAL ENCOUNTER (OUTPATIENT)
Dept: HOSPITAL 35 - MRI | Age: 81
End: 2021-07-19
Attending: NURSE PRACTITIONER
Payer: COMMERCIAL

## 2021-07-19 DIAGNOSIS — S32.010A: Primary | ICD-10-CM
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
## 2021-07-19 DIAGNOSIS — M48.07: ICD-10-CM

## 2021-07-19 DIAGNOSIS — M47.816: ICD-10-CM

## 2021-07-19 DIAGNOSIS — M51.26: ICD-10-CM
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
## 2021-07-19 DIAGNOSIS — M71.30: ICD-10-CM

## 2021-07-22 ENCOUNTER — HOSPITAL ENCOUNTER (OUTPATIENT)
Dept: HOSPITAL 35 - SJCVC | Age: 81
End: 2021-07-22
Attending: RADIOLOGY
Payer: COMMERCIAL

## 2021-07-22 DIAGNOSIS — Z95.1: ICD-10-CM

## 2021-07-22 DIAGNOSIS — Z82.49: ICD-10-CM

## 2021-07-22 DIAGNOSIS — I10: ICD-10-CM

## 2021-07-22 DIAGNOSIS — M80.08XA: Primary | ICD-10-CM

## 2021-07-22 DIAGNOSIS — M06.9: ICD-10-CM

## 2021-07-22 DIAGNOSIS — M54.9: ICD-10-CM

## 2021-07-22 DIAGNOSIS — Z86.16: ICD-10-CM

## 2021-07-22 DIAGNOSIS — E78.00: ICD-10-CM

## 2021-07-22 DIAGNOSIS — I48.20: ICD-10-CM

## 2021-07-22 DIAGNOSIS — Z79.899: ICD-10-CM

## 2021-07-22 DIAGNOSIS — I25.10: ICD-10-CM

## 2021-07-26 ENCOUNTER — HOSPITAL ENCOUNTER (OUTPATIENT)
Dept: HOSPITAL 35 - CATH | Age: 81
Discharge: HOME | End: 2021-07-26
Attending: RADIOLOGY
Payer: COMMERCIAL

## 2021-07-26 VITALS — SYSTOLIC BLOOD PRESSURE: 169 MMHG | DIASTOLIC BLOOD PRESSURE: 53 MMHG

## 2021-07-26 VITALS — SYSTOLIC BLOOD PRESSURE: 142 MMHG | DIASTOLIC BLOOD PRESSURE: 63 MMHG

## 2021-07-26 VITALS — WEIGHT: 150.36 LBS | BODY MASS INDEX: 24.16 KG/M2 | HEIGHT: 66 IN

## 2021-07-26 DIAGNOSIS — M54.9: ICD-10-CM

## 2021-07-26 DIAGNOSIS — I25.10: ICD-10-CM

## 2021-07-26 DIAGNOSIS — Z98.890: ICD-10-CM

## 2021-07-26 DIAGNOSIS — M80.08XA: Primary | ICD-10-CM

## 2021-07-26 DIAGNOSIS — Z82.49: ICD-10-CM

## 2021-07-26 DIAGNOSIS — I12.9: ICD-10-CM

## 2021-07-26 DIAGNOSIS — N18.9: ICD-10-CM

## 2021-07-26 DIAGNOSIS — I48.20: ICD-10-CM

## 2021-07-26 DIAGNOSIS — Z79.899: ICD-10-CM

## 2021-07-26 DIAGNOSIS — E78.00: ICD-10-CM

## 2021-07-26 DIAGNOSIS — K21.9: ICD-10-CM

## 2021-07-26 DIAGNOSIS — Z95.1: ICD-10-CM

## 2021-07-26 LAB
ERYTHROCYTE [DISTWIDTH] IN BLOOD BY AUTOMATED COUNT: 13.6 % (ref 10.5–14.5)
HCT VFR BLD CALC: 42.5 % (ref 42–52)
HGB BLD-MCNC: 14.5 GM/DL (ref 14–18)
INR PPP: 1.05
MCH RBC QN AUTO: 34.1 PG (ref 26–34)
MCHC RBC AUTO-ENTMCNC: 34.2 G/DL (ref 28–37)
MCV RBC: 99.9 FL (ref 80–100)
PLATELET # BLD: 181 THOU/UL (ref 150–400)
PROTHROMBIN TIME: 11.4 SECONDS (ref 10.5–12.1)
RBC # BLD AUTO: 4.25 MIL/UL (ref 4.5–6)
WBC # BLD AUTO: 3.8 THOU/UL (ref 4–11)

## 2021-07-30 ENCOUNTER — HOSPITAL ENCOUNTER (OUTPATIENT)
Dept: HOSPITAL 35 - CAT | Age: 81
End: 2021-07-30
Attending: FAMILY MEDICINE
Payer: COMMERCIAL

## 2021-07-30 DIAGNOSIS — G31.89: Primary | ICD-10-CM

## 2021-07-30 DIAGNOSIS — I67.82: ICD-10-CM

## 2021-07-30 DIAGNOSIS — R55: ICD-10-CM

## 2021-08-11 ENCOUNTER — HOSPITAL ENCOUNTER (OUTPATIENT)
Dept: HOSPITAL 35 - SJCVC | Age: 81
End: 2021-08-11
Attending: RADIOLOGY
Payer: COMMERCIAL

## 2021-08-11 DIAGNOSIS — E78.00: ICD-10-CM

## 2021-08-11 DIAGNOSIS — Z82.49: ICD-10-CM

## 2021-08-11 DIAGNOSIS — I25.10: ICD-10-CM

## 2021-08-11 DIAGNOSIS — Z86.16: ICD-10-CM

## 2021-08-11 DIAGNOSIS — Z95.1: ICD-10-CM

## 2021-08-11 DIAGNOSIS — Z79.899: ICD-10-CM

## 2021-08-11 DIAGNOSIS — I10: ICD-10-CM

## 2021-08-11 DIAGNOSIS — M80.08XA: Primary | ICD-10-CM

## 2021-08-11 DIAGNOSIS — I48.0: ICD-10-CM

## 2021-08-11 DIAGNOSIS — M06.9: ICD-10-CM

## 2021-10-20 ENCOUNTER — HOSPITAL ENCOUNTER (OUTPATIENT)
Dept: HOSPITAL 35 - SJCVCIMAG | Age: 81
End: 2021-10-20
Attending: INTERNAL MEDICINE
Payer: COMMERCIAL

## 2021-10-20 DIAGNOSIS — Z86.16: ICD-10-CM

## 2021-10-20 DIAGNOSIS — I71.4: ICD-10-CM

## 2021-10-20 DIAGNOSIS — I25.810: ICD-10-CM

## 2021-10-20 DIAGNOSIS — I48.20: ICD-10-CM

## 2021-10-20 DIAGNOSIS — R94.31: Primary | ICD-10-CM

## 2021-10-20 DIAGNOSIS — I10: ICD-10-CM

## 2021-10-20 DIAGNOSIS — M06.9: ICD-10-CM

## 2021-10-20 DIAGNOSIS — Z79.899: ICD-10-CM

## 2021-10-20 DIAGNOSIS — M54.42: ICD-10-CM

## 2021-10-20 DIAGNOSIS — E78.00: ICD-10-CM

## 2021-10-20 DIAGNOSIS — Z95.1: ICD-10-CM

## 2021-10-20 DIAGNOSIS — Z82.49: ICD-10-CM

## 2021-10-20 DIAGNOSIS — K57.92: ICD-10-CM
